# Patient Record
Sex: FEMALE | Race: WHITE | Employment: PART TIME | URBAN - METROPOLITAN AREA
[De-identification: names, ages, dates, MRNs, and addresses within clinical notes are randomized per-mention and may not be internally consistent; named-entity substitution may affect disease eponyms.]

---

## 2018-11-04 ENCOUNTER — ANESTHESIA EVENT (OUTPATIENT)
Dept: MEDSURG UNIT | Age: 23
End: 2018-11-04
Payer: COMMERCIAL

## 2018-11-05 ENCOUNTER — ANESTHESIA (OUTPATIENT)
Dept: MEDSURG UNIT | Age: 23
End: 2018-11-05
Payer: COMMERCIAL

## 2018-11-05 ENCOUNTER — HOSPITAL ENCOUNTER (OUTPATIENT)
Age: 23
Setting detail: OUTPATIENT SURGERY
Discharge: HOME | End: 2018-11-05
Attending: OTOLARYNGOLOGY | Admitting: OTOLARYNGOLOGY
Payer: COMMERCIAL

## 2018-11-05 VITALS
DIASTOLIC BLOOD PRESSURE: 64 MMHG | HEART RATE: 67 BPM | SYSTOLIC BLOOD PRESSURE: 110 MMHG | WEIGHT: 146 LBS | OXYGEN SATURATION: 96 % | BODY MASS INDEX: 22.91 KG/M2 | RESPIRATION RATE: 12 BRPM | TEMPERATURE: 98.2 F | HEIGHT: 67 IN

## 2018-11-05 DIAGNOSIS — J35.2 HYPERTROPHY OF ADENOIDS ALONE: Primary | ICD-10-CM

## 2018-11-05 LAB
HCG UR QL: NEGATIVE
HGB BLD-MCNC: 14.6 G/DL (ref 11.5–16)

## 2018-11-05 PROCEDURE — 76210000035 HC AMBSU PH I REC 1 TO 1.5 HR: Performed by: OTOLARYNGOLOGY

## 2018-11-05 PROCEDURE — 77030021668 HC NEB PREFIL KT VYRM -A

## 2018-11-05 PROCEDURE — 74011250636 HC RX REV CODE- 250/636

## 2018-11-05 PROCEDURE — 81025 URINE PREGNANCY TEST: CPT

## 2018-11-05 PROCEDURE — 77030014153 HC WND COBLATN ENT S&N -C: Performed by: OTOLARYNGOLOGY

## 2018-11-05 PROCEDURE — 77030020256 HC SOL INJ NACL 0.9%  500ML: Performed by: OTOLARYNGOLOGY

## 2018-11-05 PROCEDURE — 77030026438 HC STYL ET INTUB CARD -A: Performed by: ANESTHESIOLOGY

## 2018-11-05 PROCEDURE — 77030018836 HC SOL IRR NACL ICUM -A: Performed by: OTOLARYNGOLOGY

## 2018-11-05 PROCEDURE — 76060000061 HC AMB SURG ANES 0.5 TO 1 HR: Performed by: OTOLARYNGOLOGY

## 2018-11-05 PROCEDURE — 77030020782 HC GWN BAIR PAWS FLX 3M -B

## 2018-11-05 PROCEDURE — 76030000000 HC AMB SURG OR TIME 0.5 TO 1: Performed by: OTOLARYNGOLOGY

## 2018-11-05 PROCEDURE — 74011250637 HC RX REV CODE- 250/637: Performed by: OTOLARYNGOLOGY

## 2018-11-05 PROCEDURE — 85018 HEMOGLOBIN: CPT

## 2018-11-05 PROCEDURE — 74011250636 HC RX REV CODE- 250/636: Performed by: ANESTHESIOLOGY

## 2018-11-05 PROCEDURE — 74011000250 HC RX REV CODE- 250

## 2018-11-05 PROCEDURE — 76210000050 HC AMBSU PH II REC 0.5 TO 1 HR: Performed by: OTOLARYNGOLOGY

## 2018-11-05 PROCEDURE — 77030008684 HC TU ET CUF COVD -B: Performed by: ANESTHESIOLOGY

## 2018-11-05 RX ORDER — ROCURONIUM BROMIDE 10 MG/ML
INJECTION, SOLUTION INTRAVENOUS AS NEEDED
Status: DISCONTINUED | OUTPATIENT
Start: 2018-11-05 | End: 2018-11-05 | Stop reason: HOSPADM

## 2018-11-05 RX ORDER — ONDANSETRON 2 MG/ML
4 INJECTION INTRAMUSCULAR; INTRAVENOUS AS NEEDED
Status: DISCONTINUED | OUTPATIENT
Start: 2018-11-05 | End: 2018-11-05 | Stop reason: HOSPADM

## 2018-11-05 RX ORDER — FENTANYL CITRATE 50 UG/ML
25 INJECTION, SOLUTION INTRAMUSCULAR; INTRAVENOUS
Status: DISCONTINUED | OUTPATIENT
Start: 2018-11-05 | End: 2018-11-05 | Stop reason: HOSPADM

## 2018-11-05 RX ORDER — PROPOFOL 10 MG/ML
INJECTION, EMULSION INTRAVENOUS AS NEEDED
Status: DISCONTINUED | OUTPATIENT
Start: 2018-11-05 | End: 2018-11-05 | Stop reason: HOSPADM

## 2018-11-05 RX ORDER — AZITHROMYCIN 200 MG/5ML
5 POWDER, FOR SUSPENSION ORAL EVERY 24 HOURS
Qty: 15 ML | Refills: 0 | Status: SHIPPED | OUTPATIENT
Start: 2018-11-05 | End: 2018-11-08

## 2018-11-05 RX ORDER — SODIUM CHLORIDE 0.9 % (FLUSH) 0.9 %
5-10 SYRINGE (ML) INJECTION EVERY 8 HOURS
Status: DISCONTINUED | OUTPATIENT
Start: 2018-11-05 | End: 2018-11-05 | Stop reason: HOSPADM

## 2018-11-05 RX ORDER — MIDAZOLAM HYDROCHLORIDE 1 MG/ML
INJECTION, SOLUTION INTRAMUSCULAR; INTRAVENOUS AS NEEDED
Status: DISCONTINUED | OUTPATIENT
Start: 2018-11-05 | End: 2018-11-05 | Stop reason: HOSPADM

## 2018-11-05 RX ORDER — SODIUM CHLORIDE 0.9 % (FLUSH) 0.9 %
5-10 SYRINGE (ML) INJECTION AS NEEDED
Status: DISCONTINUED | OUTPATIENT
Start: 2018-11-05 | End: 2018-11-05 | Stop reason: HOSPADM

## 2018-11-05 RX ORDER — DEXMEDETOMIDINE HYDROCHLORIDE 4 UG/ML
INJECTION, SOLUTION INTRAVENOUS AS NEEDED
Status: DISCONTINUED | OUTPATIENT
Start: 2018-11-05 | End: 2018-11-05 | Stop reason: HOSPADM

## 2018-11-05 RX ORDER — LIDOCAINE HYDROCHLORIDE 20 MG/ML
INJECTION, SOLUTION EPIDURAL; INFILTRATION; INTRACAUDAL; PERINEURAL AS NEEDED
Status: DISCONTINUED | OUTPATIENT
Start: 2018-11-05 | End: 2018-11-05 | Stop reason: HOSPADM

## 2018-11-05 RX ORDER — DEXAMETHASONE SODIUM PHOSPHATE 4 MG/ML
INJECTION, SOLUTION INTRA-ARTICULAR; INTRALESIONAL; INTRAMUSCULAR; INTRAVENOUS; SOFT TISSUE AS NEEDED
Status: DISCONTINUED | OUTPATIENT
Start: 2018-11-05 | End: 2018-11-05 | Stop reason: HOSPADM

## 2018-11-05 RX ORDER — LIDOCAINE HYDROCHLORIDE 10 MG/ML
0.1 INJECTION, SOLUTION EPIDURAL; INFILTRATION; INTRACAUDAL; PERINEURAL AS NEEDED
Status: DISCONTINUED | OUTPATIENT
Start: 2018-11-05 | End: 2018-11-05 | Stop reason: HOSPADM

## 2018-11-05 RX ORDER — MIDAZOLAM HYDROCHLORIDE 1 MG/ML
2 INJECTION, SOLUTION INTRAMUSCULAR; INTRAVENOUS ONCE
Status: COMPLETED | OUTPATIENT
Start: 2018-11-05 | End: 2018-11-05

## 2018-11-05 RX ORDER — ACETAMINOPHEN 10 MG/ML
INJECTION, SOLUTION INTRAVENOUS AS NEEDED
Status: DISCONTINUED | OUTPATIENT
Start: 2018-11-05 | End: 2018-11-05 | Stop reason: HOSPADM

## 2018-11-05 RX ORDER — MORPHINE SULFATE 10 MG/ML
2 INJECTION, SOLUTION INTRAMUSCULAR; INTRAVENOUS
Status: DISCONTINUED | OUTPATIENT
Start: 2018-11-05 | End: 2018-11-05 | Stop reason: HOSPADM

## 2018-11-05 RX ORDER — SODIUM CHLORIDE, SODIUM LACTATE, POTASSIUM CHLORIDE, CALCIUM CHLORIDE 600; 310; 30; 20 MG/100ML; MG/100ML; MG/100ML; MG/100ML
50 INJECTION, SOLUTION INTRAVENOUS CONTINUOUS
Status: DISCONTINUED | OUTPATIENT
Start: 2018-11-05 | End: 2018-11-05 | Stop reason: HOSPADM

## 2018-11-05 RX ORDER — FENTANYL CITRATE 50 UG/ML
INJECTION, SOLUTION INTRAMUSCULAR; INTRAVENOUS AS NEEDED
Status: DISCONTINUED | OUTPATIENT
Start: 2018-11-05 | End: 2018-11-05 | Stop reason: HOSPADM

## 2018-11-05 RX ORDER — SUCCINYLCHOLINE CHLORIDE 20 MG/ML
INJECTION INTRAMUSCULAR; INTRAVENOUS AS NEEDED
Status: DISCONTINUED | OUTPATIENT
Start: 2018-11-05 | End: 2018-11-05 | Stop reason: HOSPADM

## 2018-11-05 RX ORDER — ONDANSETRON 2 MG/ML
INJECTION INTRAMUSCULAR; INTRAVENOUS AS NEEDED
Status: DISCONTINUED | OUTPATIENT
Start: 2018-11-05 | End: 2018-11-05 | Stop reason: HOSPADM

## 2018-11-05 RX ORDER — HYDROMORPHONE HYDROCHLORIDE 2 MG/ML
0.5 INJECTION, SOLUTION INTRAMUSCULAR; INTRAVENOUS; SUBCUTANEOUS
Status: DISCONTINUED | OUTPATIENT
Start: 2018-11-05 | End: 2018-11-05 | Stop reason: HOSPADM

## 2018-11-05 RX ADMIN — DEXMEDETOMIDINE HYDROCHLORIDE 20 MCG: 4 INJECTION, SOLUTION INTRAVENOUS at 10:39

## 2018-11-05 RX ADMIN — MIDAZOLAM HYDROCHLORIDE 2 MG: 1 INJECTION, SOLUTION INTRAMUSCULAR; INTRAVENOUS at 10:23

## 2018-11-05 RX ADMIN — SUCCINYLCHOLINE CHLORIDE 120 MG: 20 INJECTION INTRAMUSCULAR; INTRAVENOUS at 10:27

## 2018-11-05 RX ADMIN — ACETAMINOPHEN 650 MG: 10 INJECTION, SOLUTION INTRAVENOUS at 10:34

## 2018-11-05 RX ADMIN — FENTANYL CITRATE 50 MCG: 50 INJECTION, SOLUTION INTRAMUSCULAR; INTRAVENOUS at 10:27

## 2018-11-05 RX ADMIN — ONDANSETRON 4 MG: 2 INJECTION INTRAMUSCULAR; INTRAVENOUS at 10:34

## 2018-11-05 RX ADMIN — PROPOFOL 200 MG: 10 INJECTION, EMULSION INTRAVENOUS at 10:27

## 2018-11-05 RX ADMIN — FENTANYL CITRATE 12.5 MCG: 50 INJECTION, SOLUTION INTRAMUSCULAR; INTRAVENOUS at 11:53

## 2018-11-05 RX ADMIN — MIDAZOLAM HYDROCHLORIDE 2 MG: 1 INJECTION, SOLUTION INTRAMUSCULAR; INTRAVENOUS at 10:07

## 2018-11-05 RX ADMIN — ROCURONIUM BROMIDE 5 MG: 10 INJECTION, SOLUTION INTRAVENOUS at 10:27

## 2018-11-05 RX ADMIN — DEXAMETHASONE SODIUM PHOSPHATE 4 MG: 4 INJECTION, SOLUTION INTRA-ARTICULAR; INTRALESIONAL; INTRAMUSCULAR; INTRAVENOUS; SOFT TISSUE at 10:34

## 2018-11-05 RX ADMIN — SODIUM CHLORIDE, SODIUM LACTATE, POTASSIUM CHLORIDE, AND CALCIUM CHLORIDE 50 ML/HR: 600; 310; 30; 20 INJECTION, SOLUTION INTRAVENOUS at 09:30

## 2018-11-05 RX ADMIN — LIDOCAINE HYDROCHLORIDE 60 MG: 20 INJECTION, SOLUTION EPIDURAL; INFILTRATION; INTRACAUDAL; PERINEURAL at 10:27

## 2018-11-05 NOTE — DISCHARGE INSTRUCTIONS
DISCHARGE SUMMARY from Nurse    PATIENT INSTRUCTIONS:    After general anesthesia or intravenous sedation, for 24 hours or while taking prescription Narcotics:  · Limit your activities  · Do not drive and operate hazardous machinery  · Do not make important personal or business decisions  · Do  not drink alcoholic beverages  · If you have not urinated within 8 hours after discharge, please contact your surgeon on call. Report the following to your surgeon:  · Excessive pain, swelling, redness or odor of or around the surgical area  · Temperature over 100.5  · Nausea and vomiting lasting longer than 4 hours or if unable to take medications  · Any signs of decreased circulation or nerve impairment to extremity: change in color, persistent  numbness, tingling, coldness or increase pain  · Any questions    What to do at Home:  Recommended activity: Activity as tolerated,     If you experience any of the following symptoms excess swelling or bleeding, please follow up with Dr Anisha Munguia. *  Please give a list of your current medications to your Primary Care Provider. *  Please update this list whenever your medications are discontinued, doses are      changed, or new medications (including over-the-counter products) are added. *  Please carry medication information at all times in case of emergency situations. These are general instructions for a healthy lifestyle:    No smoking/ No tobacco products/ Avoid exposure to second hand smoke  Surgeon General's Warning:  Quitting smoking now greatly reduces serious risk to your health.     Obesity, smoking, and sedentary lifestyle greatly increases your risk for illness    A healthy diet, regular physical exercise & weight monitoring are important for maintaining a healthy lifestyle    You may be retaining fluid if you have a history of heart failure or if you experience any of the following symptoms:  Weight gain of 3 pounds or more overnight or 5 pounds in a week, increased swelling in our hands or feet or shortness of breath while lying flat in bed. Please call your doctor as soon as you notice any of these symptoms; do not wait until your next office visit. Recognize signs and symptoms of STROKE:    F-face looks uneven    A-arms unable to move or move unevenly    S-speech slurred or non-existent    T-time-call 911 as soon as signs and symptoms begin-DO NOT go       Back to bed or wait to see if you get better-TIME IS BRAIN. Warning Signs of HEART ATTACK     Call 911 if you have these symptoms:   Chest discomfort. Most heart attacks involve discomfort in the center of the chest that lasts more than a few minutes, or that goes away and comes back. It can feel like uncomfortable pressure, squeezing, fullness, or pain.  Discomfort in other areas of the upper body. Symptoms can include pain or discomfort in one or both arms, the back, neck, jaw, or stomach.  Shortness of breath with or without chest discomfort.  Other signs may include breaking out in a cold sweat, nausea, or lightheadedness. Don't wait more than five minutes to call 911 - MINUTES MATTER! Fast action can save your life. Calling 911 is almost always the fastest way to get lifesaving treatment. Emergency Medical Services staff can begin treatment when they arrive -- up to an hour sooner than if someone gets to the hospital by car. The discharge information has been reviewed with the parent. The parent verbalized understanding. Discharge medications reviewed with the guardian and appropriate educational materials and side effects teaching were provided. ___________________________________________________________________________________________________________________________________[facility]    Post Operative Adenoidectomy Instructions    1. Your child may be irritable or fretful during the first few hours after surgery.   Generally, behavior returns to normal after a nap. 2. Liquids are allowed as soon as you leave the hospital.  If nausea occurs, wait 30 minutes and try liquids again. A regular diet can be resumed three hours after leaving the surgery center. If citrus juice or other foods seem to irritate your child, avoid those foods. 3. Normal activity is permitted as tolerated by the child. 4. Mild fever is expected. Use Tylenol, Motrin, or a sponge bath to bring down the temperature. If the fever persists after using Tylenol or Motrin and it is above 101.5, call the office. 5. Mouth odor is expected during the first two weeks. Nasal congestion and thick drainage is expected, saline drops can be used. 6. Use Tylenol or Motrin for pain. 7. Bleeding is rare following an adenoidectomy. If bleeding occurs from the mouth or nose it will usually stop in 5-10 minutes. If a steady trickle continues, call the office. Office Phone:  [address] [facility] office hours are 8:00 a.m. to 4:30 p.m. You should be able to reach us after hours by calling the regular office number. If for some reason you are not able to reach our [address] [place] [address] service through this main number you may call them directly at [phone].     Tylenol IV given at 10:30am

## 2018-11-05 NOTE — H&P
[facility] The history and physical is reviewed by me and updated today. There are no changes from the previous history and physical.  This file should be an external document in the notes section or could be in the media portion of the chart. The risks of the procedure including, bleeding, infection, problems with anesthesia, need for further procedures, and death have been discussed with the patient. We also discussed the fact that symptoms may not improve or potentially could worsen. Also discussed the alternatives of continued medical management. The patient desires to proceed.  
 
Bettye Sandoval MD

## 2018-11-05 NOTE — OP NOTES
Patient Name: Maritza Degroot  MRN: [id number]  : 1995  DOS: 2018    OPERATIVE REPORT     Preoperative diagnosis: Adenoid hypertrophy    Postoperative diagnosis: Adenoid hypertrophy    Operation: Adenoidectomy    Surgeon: Swati Guerra. MD Trevor  Assistant: None    Anesthesia: General endotracheal by General.    Estimated blood loss: Zero milliliters  Complications: None. Drains: None  Implants: None  Specimens: None    Condition: Stable to PACU. Indications for procedure: This a 26 y.o. female who has symptomatic adenoid hypertrophy. The risks, benefits, and alternatives of surgery including but not limited to bleeding, infection, voice change, velopharyngeal insufficiency, sore throat, pain, admission for dehydration, and the expected post-operative course were discussed. Procedure: After informed consent was obtained, the patient was brought to the operating room and placed supine on the table. General endotracheal anesthesia was induced. A preoperative timeout confiming patient and procedure was performed. A shoulder roll was placed to aid in neck extension. The table was rotated 90 degrees, and the patient was draped in the usual fashion. A Siri-Celio mouth gag was inserted through the oral cavity, retracted, and suspended from the [place] stand. The soft palate was palpated to detect a submucous cleft which was not apparent. A soft catheter was passed through the nose and out through the mouth and clamped over a rolled 4x4 sponge to retract the soft palate. A complete coblation adenoidectomy was performed. Hemostasis was assured. The nose, nasopharynx, and oropharynx were then irrigated with copious saline. Excellent hemostasis was noted. The mouth gag was removed, and the patient was awakened, extubated, and taken to the recovery room in stable condition.        Cassandra Jones MD  2018  9:39 AM

## 2018-11-05 NOTE — ANESTHESIA POSTPROCEDURE EVALUATION
Post-Anesthesia Evaluation and Assessment Patient: Shmuel Melgar MRN: [id number]  SSN: [id number] YOB: 1995  Age: 26 y.o. Sex: female I have evaluated the patient and they are stable and ready for discharge from the PACU. Cardiovascular Function/Vital Signs Visit Vitals /60 Pulse 68 Temp 36.8 °C (98.2 °F) Resp 16 Ht 5' 7\" (1.702 m) Wt 66.2 kg (146 lb) SpO2 99% BMI 22.87 kg/m² Patient is status post General anesthesia for Procedure(s): ADENOIDECTOMY. Nausea/Vomiting: None Postoperative hydration reviewed and adequate. Pain: 
Pain Scale 1: Numeric (0 - 10) (11/05/18 1130) Pain Intensity 1: 3 (11/05/18 1130) Managed Neurological Status:  
Neuro (WDL): Within Defined Limits (11/05/18 1130) At baseline Mental Status, Level of Consciousness: Alert and  oriented to person, place, and time Pulmonary Status:  
O2 Device: Room air (11/05/18 1130) Adequate oxygenation and airway patent Complications related to anesthesia: None Post-anesthesia assessment completed. No concerns Signed By: Peyton Moreira MD   
 November 5, 2018 Procedure(s): ADENOIDECTOMY. 
 
<BSHSIANPOST> Visit Vitals /60 Pulse 68 Temp 36.8 °C (98.2 °F) Resp 16 Ht 5' 7\" (1.702 m) Wt 66.2 kg (146 lb) SpO2 99% BMI 22.87 kg/m²

## 2018-11-05 NOTE — ANESTHESIA PREPROCEDURE EVALUATION
Anesthetic History No history of anesthetic complications Review of Systems / Medical History Patient summary reviewed, nursing notes reviewed and pertinent labs reviewed Pulmonary Within defined limits Neuro/Psych Within defined limits Cardiovascular Within defined limits Dysrhythmias : SVT Exercise tolerance: >4 METS 
  
GI/Hepatic/Renal 
Within defined limits Endo/Other Within defined limits Other Findings Physical Exam 
 
Airway Mallampati: I 
TM Distance: > 6 cm Neck ROM: normal range of motion Mouth opening: Normal 
 
 Cardiovascular Rhythm: regular Rate: normal 
 
 
 
 Dental 
 
Dentition: Lower braces and Upper braces Pulmonary Breath sounds clear to auscultation Abdominal 
 
 
 
 Other Findings Anesthetic Plan ASA: 2 Anesthesia type: general 
 
 
 
 
Induction: Intravenous Anesthetic plan and risks discussed with: Patient

## 2018-11-05 NOTE — ROUTINE PROCESS
Patient: Kyle Preston MRN: [id number]  SSN: [id number] YOB: 1995  Age: 26 y.o. Sex: female Patient is status post Procedure(s): ADENOIDECTOMY. Surgeon(s) and Role: * Cruz Murray MD - Primary Local/Dose/Irrigation:   
 
             
Peripheral IV 11/05/18 Left Antecubital (Active) Site Assessment Clean, dry, & intact 11/5/2018  9:29 AM  
Phlebitis Assessment 0 11/5/2018  9:29 AM  
Infiltration Assessment 0 11/5/2018  9:29 AM  
Dressing Status Clean, dry, & intact 11/5/2018  9:29 AM  
Dressing Type Topical skin adhesive; Tape 11/5/2018  9:29 AM  
Hub Color/Line Status Blue; Infusing 11/5/2018  9:29 AM  
                 
 
 
 
Dressing/Packing:    
Splint/Cast:  ] Other:

## (undated) DEVICE — EVAC 70 XTRA WAND: Brand: COBLATION

## (undated) DEVICE — SOLUTION IV 1000ML 0.9% SOD CHL

## (undated) DEVICE — BULB SYRINGE, IRRIGATION WITH PROTECTIVE CAP, 60 CC, INDIVIDUALLY WRAPPED: Brand: DOVER

## (undated) DEVICE — TIP SUCT BLU PLAS BLB W/O CTRL VENT YANK

## (undated) DEVICE — 1200 GUARD II KIT W/5MM TUBE W/O VAC TUBE: Brand: GUARDIAN

## (undated) DEVICE — STERILE POLYISOPRENE POWDER-FREE SURGICAL GLOVES: Brand: PROTEXIS

## (undated) DEVICE — INFECTION CONTROL KIT SYS

## (undated) DEVICE — Device

## (undated) DEVICE — CATH SUC CTRL PRT 10FR LF STRL --

## (undated) DEVICE — SOL ANTI-FOG 6ML MEDC -- MEDICHOICE - CONVERT TO 358427

## (undated) DEVICE — APPLICATOR FBR TIP L6IN COT TIP WOOD SHFT SWAB 2000 PER CA

## (undated) DEVICE — DEVON™ KNEE AND BODY STRAP 60" X 3" (1.5 M X 7.6 CM): Brand: DEVON

## (undated) DEVICE — SOLUTION IV 500ML 0.9% SOD CHL FLX CONT

## (undated) DEVICE — MEDI-VAC NON-CONDUCTIVE SUCTION TUBING: Brand: CARDINAL HEALTH